# Patient Record
Sex: FEMALE | Race: OTHER | Employment: FULL TIME | URBAN - METROPOLITAN AREA
[De-identification: names, ages, dates, MRNs, and addresses within clinical notes are randomized per-mention and may not be internally consistent; named-entity substitution may affect disease eponyms.]

---

## 2024-01-19 ENCOUNTER — OFFICE VISIT (OUTPATIENT)
Dept: NEUROLOGY | Age: 44
End: 2024-01-19
Payer: COMMERCIAL

## 2024-01-19 VITALS
HEART RATE: 89 BPM | WEIGHT: 235 LBS | DIASTOLIC BLOOD PRESSURE: 87 MMHG | OXYGEN SATURATION: 97 % | SYSTOLIC BLOOD PRESSURE: 124 MMHG | BODY MASS INDEX: 36.81 KG/M2

## 2024-01-19 DIAGNOSIS — G35 MULTIPLE SCLEROSIS (HCC): Primary | ICD-10-CM

## 2024-01-19 DIAGNOSIS — E55.9 VITAMIN D DEFICIENCY: ICD-10-CM

## 2024-01-19 PROCEDURE — 99205 OFFICE O/P NEW HI 60 MIN: CPT | Performed by: NURSE PRACTITIONER

## 2024-01-19 RX ORDER — SUMATRIPTAN 25 MG/1
25 TABLET, FILM COATED ORAL
COMMUNITY

## 2024-01-19 ASSESSMENT — PATIENT HEALTH QUESTIONNAIRE - PHQ9
1. LITTLE INTEREST OR PLEASURE IN DOING THINGS: 0
SUM OF ALL RESPONSES TO PHQ QUESTIONS 1-9: 0
SUM OF ALL RESPONSES TO PHQ QUESTIONS 1-9: 0
SUM OF ALL RESPONSES TO PHQ9 QUESTIONS 1 & 2: 0
SUM OF ALL RESPONSES TO PHQ QUESTIONS 1-9: 0
DEPRESSION UNABLE TO ASSESS: FUNCTIONAL CAPACITY MOTIVATION LIMITS ACCURACY
SUM OF ALL RESPONSES TO PHQ QUESTIONS 1-9: 0
2. FEELING DOWN, DEPRESSED OR HOPELESS: 0

## 2024-01-19 ASSESSMENT — ENCOUNTER SYMPTOMS: GASTROINTESTINAL NEGATIVE: 1

## 2024-01-19 NOTE — PROGRESS NOTES
[facility] NEUROLOG[facility]  [address], [address]  [place] [address]  Phone: [phone] Fax [phone]  CARSON Mullins      Patient: Jewell Chan   Provider: CARSON Mullins    CC:   Chief Complaint   Patient presents with    New Patient    Multiple Sclerosis       Referring Provider: Nevaeh Sage MD     History of Present Illness:     Jewell Chan is a 43 y.o.  female with a history of asthma, MS, DM, high cholesterol who presents for evaluation of MS.       She is accompanied alone.        Briefly, diagnosed in 2020 but left eye blurred vision, neck stiffness, and muscle spasms which progressed for 1 month followed by BLE weakness and inability to walk without great difficulty between November 2019-February 2020. MRI Brain, C-Spine, and T-Spine. She was started on Ocrevus infusion and has been receiving q 6 months. Since being on treatment, has noticed more frequent URIs. Has asthma at baseline. 3-4x per year, previously 2x per year but admits that she usually gets sick frequently. Had the flu vaccine in the fall but covid & flu in early jan.       In 2010, had question left optic neuritis which was noted after a surgical procedure. She had a syncopal episode and had to have a blood transfusion following surgery at the time lost vision to her left eye. Resolved with steroids.       She has been receiving her ocrevus infusions in [place]. Last infusion September, next in March 2024. Returns to [place] for her infusions.       She reports fluctuating fatigue. She reports frequent forgetfulness, found that she had a cognitive fogging after first time of COVID in 2021. She finds difficulty with comprehension, frequent forgetfulness.Has tried adderall in the past but did not like the side effects.       If she has to work with numbers she finds this difficulty.       Had Covid and the flu on 1/5      Denies bowel or bladder incontinence.       Denies spasticity.       Working at [employer] as a manager,

## 2024-01-19 NOTE — ASSESSMENT & PLAN NOTE
Proceed with Ocrevus. She would like to receive her March Ocrevus with previous provider. Discussed that we can fill out paperwork to transfer care to this location when I see her back. Encouraged her to get copies of previous MRIs from [facility] and bring disk back with her to [place] so we can have radiology compare her next MRI to previous.l       Discussed possibly adding a medication such as Adderall, Concerta, Vyvanse to assist with fatigue. She declined wanting to pursue this at this time. She endorses MS fatigue as well as COVID long hauler fatigue.     Encouraged exercise       Will need immunoglobulins in September 2024    Next MRI in May 2024

## 2024-11-12 ENCOUNTER — OFFICE VISIT (OUTPATIENT)
Dept: NEUROLOGY | Age: 44
End: 2024-11-12
Payer: COMMERCIAL

## 2024-11-12 VITALS
WEIGHT: 240.6 LBS | BODY MASS INDEX: 37.76 KG/M2 | HEIGHT: 67 IN | HEART RATE: 99 BPM | DIASTOLIC BLOOD PRESSURE: 83 MMHG | SYSTOLIC BLOOD PRESSURE: 135 MMHG | OXYGEN SATURATION: 96 %

## 2024-11-12 DIAGNOSIS — G35 MULTIPLE SCLEROSIS (HCC): Primary | ICD-10-CM

## 2024-11-12 DIAGNOSIS — E55.9 VITAMIN D DEFICIENCY: ICD-10-CM

## 2024-11-12 PROCEDURE — 99214 OFFICE O/P EST MOD 30 MIN: CPT | Performed by: NURSE PRACTITIONER

## 2024-11-12 RX ORDER — SODIUM CHLORIDE 9 MG/ML
INJECTION, SOLUTION INTRAVENOUS CONTINUOUS
OUTPATIENT
Start: 2024-11-12

## 2024-11-12 RX ORDER — ALBUTEROL SULFATE 90 UG/1
4 INHALANT RESPIRATORY (INHALATION) PRN
OUTPATIENT
Start: 2024-11-12

## 2024-11-12 RX ORDER — FAMOTIDINE 10 MG/ML
20 INJECTION, SOLUTION INTRAVENOUS
OUTPATIENT
Start: 2024-11-12

## 2024-11-12 RX ORDER — FAMOTIDINE 20 MG/1
20 TABLET, FILM COATED ORAL ONCE
OUTPATIENT
Start: 2024-11-12 | End: 2024-11-12

## 2024-11-12 RX ORDER — SODIUM CHLORIDE 0.9 % (FLUSH) 0.9 %
5-40 SYRINGE (ML) INJECTION PRN
OUTPATIENT
Start: 2024-11-12

## 2024-11-12 RX ORDER — ACETAMINOPHEN 325 MG/1
650 TABLET ORAL
OUTPATIENT
Start: 2024-11-12

## 2024-11-12 RX ORDER — SODIUM CHLORIDE 9 MG/ML
5-250 INJECTION, SOLUTION INTRAVENOUS PRN
OUTPATIENT
Start: 2024-11-12

## 2024-11-12 RX ORDER — HEPARIN SODIUM (PORCINE) LOCK FLUSH IV SOLN 100 UNIT/ML 100 UNIT/ML
500 SOLUTION INTRAVENOUS PRN
OUTPATIENT
Start: 2024-11-12

## 2024-11-12 RX ORDER — EPINEPHRINE 1 MG/ML
0.3 INJECTION, SOLUTION, CONCENTRATE INTRAVENOUS PRN
OUTPATIENT
Start: 2024-11-12

## 2024-11-12 RX ORDER — DIPHENHYDRAMINE HYDROCHLORIDE 50 MG/ML
50 INJECTION INTRAMUSCULAR; INTRAVENOUS
OUTPATIENT
Start: 2024-11-12

## 2024-11-12 RX ORDER — ACETAMINOPHEN 325 MG/1
650 TABLET ORAL ONCE
OUTPATIENT
Start: 2024-11-12 | End: 2024-11-12

## 2024-11-12 RX ORDER — ONDANSETRON 2 MG/ML
8 INJECTION INTRAMUSCULAR; INTRAVENOUS
OUTPATIENT
Start: 2024-11-12

## 2024-11-12 RX ORDER — DIPHENHYDRAMINE HYDROCHLORIDE 50 MG/ML
25 INJECTION INTRAMUSCULAR; INTRAVENOUS ONCE
OUTPATIENT
Start: 2024-11-12 | End: 2024-11-12

## 2024-11-12 ASSESSMENT — ENCOUNTER SYMPTOMS: GASTROINTESTINAL NEGATIVE: 1

## 2024-11-12 NOTE — ASSESSMENT & PLAN NOTE
Continue Ocrevus.  Last Ocrevus on 11/1/2024.  Would like to transfer here.  Patient forms filled out while in office.  Most recent neuroimaging is stable.  Immunoglobulins up-to-date.    Will schedule brain check to monitor longitudinal cognition

## 2024-11-12 NOTE — PROGRESS NOTES
[facility]  [address], [address]  [place] [address]  Phone: [phone] Fax [phone]  CARSON Mullins      Patient: Jewell Chan   Provider: CARSON Mullins    CC:   Chief Complaint   Patient presents with    Follow-up       Referring Provider: No ref. provider found     History of Present Illness:     Jewell Chan is a 44 y.o.  female with a history of asthma, MS, DM, high cholesterol who presents for evaluation of MS.       She is accompanied alone.     Had Ocrevus November 2024. No fevers, infections, or illnesses. No falls in the last 90 days. Sleep quality is more good than bad days. Denies any new MS symptoms. She reports spasticity, mostly at night, LLE > RLE. This is not bothersome. She stretches daily in the morning. She is trying to focus on exercise. Vitamin D level has increased now with 5,000 IU once daily. She sees her previous neurologist twice per year (April and October). She would like to transfer Ocrevus at her next visit.        Briefly, diagnosed in 2020 but left eye blurred vision, neck stiffness, and muscle spasms which progressed for 1 month followed by BLE weakness and inability to walk without great difficulty between November 2019-February 2020. MRI Brain, C-Spine, and T-Spine. She was started on Ocrevus infusion and has been receiving q 6 months. Since being on treatment, has noticed more frequent URIs. Has asthma at baseline. 3-4x per year, previously 2x per year but admits that she usually gets sick frequently. Had the flu vaccine in the fall but covid & flu in early jan.       In 2010, had question left optic neuritis which was noted after a surgical procedure. She had a syncopal episode and had to have a blood transfusion following surgery at the time lost vision to her left eye. Resolved with steroids.       She has been receiving her ocrevus infusions in [place]. Last infusion September, next in March 2024. Returns to [place] for her infusions.       She

## 2025-05-05 ENCOUNTER — OFFICE VISIT (OUTPATIENT)
Dept: NEUROLOGY | Age: 45
End: 2025-05-05
Payer: COMMERCIAL

## 2025-05-05 DIAGNOSIS — G35 MULTIPLE SCLEROSIS (HCC): Primary | ICD-10-CM

## 2025-05-05 DIAGNOSIS — R41.89 COGNITIVE CHANGE: ICD-10-CM

## 2025-05-05 PROCEDURE — 96138 PSYCL/NRPSYC TECH 1ST: CPT | Performed by: NURSE PRACTITIONER

## 2025-05-05 PROCEDURE — 96132 NRPSYC TST EVAL PHYS/QHP 1ST: CPT | Performed by: NURSE PRACTITIONER

## 2025-05-05 NOTE — PROGRESS NOTES
05/05/25  Jewell Chan      Cognitive Assessment  Reason for testing: cognitive change    SUBJECTIVE:   This 45 year old female was administered a battery of neurocognitive testing on 05/05/2025.      OBJECTIVE:  Tests Administered:  Trails A, Trails B, Digit Symbol Substitution, Stroop, Immediate Recognition, Delayed  Recognition, Coordination Test        Test Results:  Cognitive testing was provided via a battery of cognitive assessments. The pattern of test scores  indicate that results are valid.A Clinical Report with further description of scores and results is also available.    Overall: Patient tested in the 4th percentile (standard score of 74/200).    Attention:  Trails A: Patient tested in the 10th percentile (scaled standard score of 81/200).    Mental Flexibility:  Trails B: Patient tested in the 19th (scaled standard score of 87/200).    Executive Function:  Digit Symbol Substitution: Patient tested in the 25th percentile (scaled standard score of 90/200).  Stroop: Patient tested in the 28th percentile (scaled standard score of 91/200).    Memory:  Immediate Recognition: Patient tested in the 54th percentile (scaled standard score of 101/200).  Delayed Recognition: Patient tested in the 1st percentile (scaled standard score of 66/200).     ** These assessments were not scored because they were potentially invalid, or the patient failed to  complete in the allotted time.      Interpretation of Test Scores:    Patient scores 74 in cognitive functioning. His/Her complaints are/are not corroborated with standardized testing.     Clinical Decision Making and Plan:   No changes to current medications indicated at this point.     Feedback to Patient:   Will be discussed with patient at next scheduled visit.       CARSON Mullins  [place] [address] Jeniffer [address]co [address] [place] [address]  Phone: [phone]  Fax: [phone]

## 2025-05-27 ENCOUNTER — TELEPHONE (OUTPATIENT)
Dept: NEUROLOGY | Age: 45
End: 2025-05-27

## 2025-06-05 ENCOUNTER — TELEPHONE (OUTPATIENT)
Dept: NEUROLOGY | Age: 45
End: 2025-06-05

## 2025-06-05 NOTE — TELEPHONE ENCOUNTER
Attempt to reach patient to schedule Ocrevus Infusion. Left voice message and also sent MyChart message.

## 2025-06-13 ENCOUNTER — TELEPHONE (OUTPATIENT)
Dept: NEUROLOGY | Age: 45
End: 2025-06-13